# Patient Record
Sex: MALE | Race: OTHER | URBAN - METROPOLITAN AREA
[De-identification: names, ages, dates, MRNs, and addresses within clinical notes are randomized per-mention and may not be internally consistent; named-entity substitution may affect disease eponyms.]

---

## 2017-05-05 ENCOUNTER — HOSPITAL ENCOUNTER (OUTPATIENT)
Age: 32
Discharge: HOME | End: 2017-05-05
Attending: EMERGENCY MEDICINE
Payer: MEDICAID

## 2017-05-05 VITALS
HEART RATE: 91 BPM | DIASTOLIC BLOOD PRESSURE: 83 MMHG | TEMPERATURE: 98 F | SYSTOLIC BLOOD PRESSURE: 130 MMHG | RESPIRATION RATE: 18 BRPM | OXYGEN SATURATION: 99 %

## 2017-05-05 DIAGNOSIS — S61.219A LACERATION OF FINGER, INITIAL ENCOUNTER: Primary | ICD-10-CM

## 2017-05-05 PROCEDURE — 90471 IMMUNIZATION ADMIN: CPT

## 2017-05-05 PROCEDURE — 99203 OFFICE O/P NEW LOW 30 MIN: CPT

## 2017-05-05 NOTE — ED PROVIDER NOTES
Patient Seen in: [facility] In [place]    History   Patient presents with:  Laceration Abrasion (integumentary)    Stated Complaint: rt middle finger lac    HPI    42-year-old male presents for complaint of laceration to his right Neurological: He is alert and oriented to person, place, and time. Skin: Skin is warm and dry. Psychiatric: He has a normal mood and affect. His speech is normal.   Nursing note and vitals reviewed.             ED Course   Labs Reviewed - No data to dis Schedule an appointment as soon as possible for a visit in 2 days  As needed      Medications Prescribed:  There are no discharge medications for this patient.

## 2017-05-05 NOTE — ED INITIAL ASSESSMENT (HPI)
Presents with right 3rd finger laceration after cutting himself with a knife last night doing dishes. Bleeding controlled. 2 cm laceration noted. Tetnus not up to date.

## 2018-01-21 ENCOUNTER — HOSPITAL ENCOUNTER (OUTPATIENT)
Age: 33
Discharge: STILL ADMITTED OR ADMINISTRATIVE | End: 2018-01-21
Attending: EMERGENCY MEDICINE

## (undated) NOTE — ED AVS SNAPSHOT
[facility] in [facility] [address]  [address] [facility]    Phone:  [phone]    Fax:  [phone]           Ozzy Wagner   MRN: [id number]    Department:  [facility] in [address] [place] [address]   Date of Visit:  5/5/2 follow-up care and referrals. It is our goal to assure that you are completely satisfied with every aspect of your visit today.   In an effort to constantly improve our service to you, we would appreciate any positive or negative feedback related to the [employer] account. You may have had testing done that requires us to contact you. Please make sure we have your correct phone number on file.      OUR CURRENT HOURS OF OPERATION:  MONDAY THROUGH FRIDAY 8AM - 8PM  WEEKENDS AND HOLIDAYS 8AM - 6PM    I certifi Sign up for [employer], your secure online medical record. [employer] will allow you to access patient instructions from your recent visit,  view other health information, and more. To sign up or find more information, go to [email or web address]